# Patient Record
Sex: FEMALE | Race: OTHER | HISPANIC OR LATINO | URBAN - METROPOLITAN AREA
[De-identification: names, ages, dates, MRNs, and addresses within clinical notes are randomized per-mention and may not be internally consistent; named-entity substitution may affect disease eponyms.]

---

## 2017-04-21 ENCOUNTER — OUTPATIENT (OUTPATIENT)
Dept: OUTPATIENT SERVICES | Facility: HOSPITAL | Age: 62
LOS: 1 days | End: 2017-04-21
Payer: MEDICAID

## 2017-04-21 VITALS
HEART RATE: 84 BPM | WEIGHT: 130.07 LBS | HEIGHT: 62 IN | DIASTOLIC BLOOD PRESSURE: 78 MMHG | RESPIRATION RATE: 12 BRPM | OXYGEN SATURATION: 98 % | SYSTOLIC BLOOD PRESSURE: 133 MMHG | TEMPERATURE: 99 F

## 2017-04-21 DIAGNOSIS — Z01.818 ENCOUNTER FOR OTHER PREPROCEDURAL EXAMINATION: ICD-10-CM

## 2017-04-21 DIAGNOSIS — Z90.49 ACQUIRED ABSENCE OF OTHER SPECIFIED PARTS OF DIGESTIVE TRACT: Chronic | ICD-10-CM

## 2017-04-21 DIAGNOSIS — Z90.710 ACQUIRED ABSENCE OF BOTH CERVIX AND UTERUS: Chronic | ICD-10-CM

## 2017-04-21 DIAGNOSIS — K64.1 SECOND DEGREE HEMORRHOIDS: ICD-10-CM

## 2017-04-21 PROCEDURE — G0463: CPT

## 2017-04-21 NOTE — H&P PST ADULT - NSANTHOSAYNRD_GEN_A_CORE
No. CHAVO screening performed.  STOP BANG Legend: 0-2 = LOW Risk; 3-4 = INTERMEDIATE Risk; 5-8 = HIGH Risk

## 2017-05-03 ENCOUNTER — TRANSCRIPTION ENCOUNTER (OUTPATIENT)
Age: 62
End: 2017-05-03

## 2017-05-04 ENCOUNTER — OUTPATIENT (OUTPATIENT)
Dept: OUTPATIENT SERVICES | Facility: HOSPITAL | Age: 62
LOS: 1 days | End: 2017-05-04
Payer: MEDICAID

## 2017-05-04 VITALS
HEIGHT: 62 IN | RESPIRATION RATE: 15 BRPM | WEIGHT: 130.07 LBS | DIASTOLIC BLOOD PRESSURE: 72 MMHG | SYSTOLIC BLOOD PRESSURE: 136 MMHG | OXYGEN SATURATION: 98 % | HEART RATE: 77 BPM | TEMPERATURE: 98 F

## 2017-05-04 VITALS
HEART RATE: 72 BPM | SYSTOLIC BLOOD PRESSURE: 130 MMHG | DIASTOLIC BLOOD PRESSURE: 80 MMHG | TEMPERATURE: 97 F | RESPIRATION RATE: 15 BRPM | OXYGEN SATURATION: 100 %

## 2017-05-04 DIAGNOSIS — K64.1 SECOND DEGREE HEMORRHOIDS: ICD-10-CM

## 2017-05-04 DIAGNOSIS — Z90.49 ACQUIRED ABSENCE OF OTHER SPECIFIED PARTS OF DIGESTIVE TRACT: Chronic | ICD-10-CM

## 2017-05-04 DIAGNOSIS — Z90.710 ACQUIRED ABSENCE OF BOTH CERVIX AND UTERUS: Chronic | ICD-10-CM

## 2017-05-04 PROCEDURE — 88304 TISSUE EXAM BY PATHOLOGIST: CPT

## 2017-05-04 PROCEDURE — C1889: CPT

## 2017-05-04 PROCEDURE — 88304 TISSUE EXAM BY PATHOLOGIST: CPT | Mod: 26

## 2017-05-04 PROCEDURE — 46260 REMOVE IN/EX HEM GROUPS 2+: CPT

## 2017-05-04 RX ORDER — CELECOXIB 200 MG/1
200 CAPSULE ORAL ONCE
Qty: 0 | Refills: 0 | Status: DISCONTINUED | OUTPATIENT
Start: 2017-05-04 | End: 2017-05-19

## 2017-05-04 RX ORDER — SODIUM CHLORIDE 9 MG/ML
1000 INJECTION, SOLUTION INTRAVENOUS
Qty: 0 | Refills: 0 | Status: DISCONTINUED | OUTPATIENT
Start: 2017-05-04 | End: 2017-05-19

## 2017-05-04 RX ORDER — MINOXIDIL 10 MG
0 TABLET ORAL
Qty: 0 | Refills: 0 | COMMUNITY

## 2017-05-04 RX ORDER — CHOLECALCIFEROL (VITAMIN D3) 125 MCG
50000 CAPSULE ORAL
Qty: 0 | Refills: 0 | COMMUNITY

## 2017-05-04 RX ORDER — OXYCODONE HYDROCHLORIDE 5 MG/1
5 TABLET ORAL ONCE
Qty: 0 | Refills: 0 | Status: DISCONTINUED | OUTPATIENT
Start: 2017-05-04 | End: 2017-05-04

## 2017-05-04 RX ORDER — ONDANSETRON 8 MG/1
4 TABLET, FILM COATED ORAL ONCE
Qty: 0 | Refills: 0 | Status: DISCONTINUED | OUTPATIENT
Start: 2017-05-04 | End: 2017-05-19

## 2017-05-04 RX ORDER — FAMOTIDINE 10 MG/ML
1 INJECTION INTRAVENOUS
Qty: 0 | Refills: 0 | COMMUNITY

## 2017-05-04 RX ORDER — ACETAMINOPHEN 500 MG
975 TABLET ORAL ONCE
Qty: 0 | Refills: 0 | Status: DISCONTINUED | OUTPATIENT
Start: 2017-05-04 | End: 2017-05-19

## 2017-05-04 NOTE — ASU DISCHARGE PLAN (ADULT/PEDIATRIC). - MEDICATION SUMMARY - MEDICATIONS TO TAKE
I will START or STAY ON the medications listed below when I get home from the hospital:    naproxen 375 mg oral tablet  -- 1 tab(s) by mouth once a day, As Needed  -- Indication: For pain contol    pravastatin 20 mg oral tablet  -- 1 tab(s) by mouth once a day  -- Indication: For HLD    minoxidil 2.5 mg oral tablet  --  by mouth once a day  -- Indication: For HTN    Vitamin D3  -- 99071 milligram(s) by mouth weekly  -- Indication: For Supplement

## 2018-03-17 NOTE — BRIEF OPERATIVE NOTE - ANTIBIOTIC PROTOCOL
Followed protocol
no abdominal pain, no bloating, no constipation, no diarrhea, no nausea and no vomiting.

## 2020-01-18 PROBLEM — K57.92 DIVERTICULITIS OF INTESTINE, PART UNSPECIFIED, WITHOUT PERFORATION OR ABSCESS WITHOUT BLEEDING: Chronic | Status: ACTIVE | Noted: 2017-04-21

## 2020-01-18 PROBLEM — I86.8 VARICOSE VEINS OF OTHER SPECIFIED SITES: Chronic | Status: ACTIVE | Noted: 2017-04-21

## 2020-01-18 PROBLEM — L65.9 NONSCARRING HAIR LOSS, UNSPECIFIED: Chronic | Status: ACTIVE | Noted: 2017-04-21

## 2020-01-18 PROBLEM — K64.9 UNSPECIFIED HEMORRHOIDS: Chronic | Status: ACTIVE | Noted: 2017-04-21

## 2020-01-18 PROBLEM — E78.5 HYPERLIPIDEMIA, UNSPECIFIED: Chronic | Status: ACTIVE | Noted: 2017-04-21

## 2020-01-23 PROBLEM — Z00.00 ENCOUNTER FOR PREVENTIVE HEALTH EXAMINATION: Status: ACTIVE | Noted: 2020-01-23

## 2020-01-24 ENCOUNTER — APPOINTMENT (OUTPATIENT)
Dept: ORTHOPEDIC SURGERY | Facility: CLINIC | Age: 65
End: 2020-01-24
Payer: MEDICAID

## 2020-01-24 VITALS — BODY MASS INDEX: 22.68 KG/M2 | WEIGHT: 128 LBS | HEIGHT: 63 IN

## 2020-01-24 DIAGNOSIS — G56.01 CARPAL TUNNEL SYNDROME, RIGHT UPPER LIMB: ICD-10-CM

## 2020-01-24 DIAGNOSIS — Z56.0 UNEMPLOYMENT, UNSPECIFIED: ICD-10-CM

## 2020-01-24 DIAGNOSIS — M75.81 OTHER SHOULDER LESIONS, RIGHT SHOULDER: ICD-10-CM

## 2020-01-24 DIAGNOSIS — Z83.3 FAMILY HISTORY OF DIABETES MELLITUS: ICD-10-CM

## 2020-01-24 DIAGNOSIS — Z80.9 FAMILY HISTORY OF MALIGNANT NEOPLASM, UNSPECIFIED: ICD-10-CM

## 2020-01-24 DIAGNOSIS — Z86.39 PERSONAL HISTORY OF OTHER ENDOCRINE, NUTRITIONAL AND METABOLIC DISEASE: ICD-10-CM

## 2020-01-24 DIAGNOSIS — M54.2 CERVICALGIA: ICD-10-CM

## 2020-01-24 DIAGNOSIS — Z72.3 LACK OF PHYSICAL EXERCISE: ICD-10-CM

## 2020-01-24 PROCEDURE — 72040 X-RAY EXAM NECK SPINE 2-3 VW: CPT

## 2020-01-24 PROCEDURE — 99203 OFFICE O/P NEW LOW 30 MIN: CPT | Mod: 25

## 2020-01-24 PROCEDURE — 73030 X-RAY EXAM OF SHOULDER: CPT | Mod: RT

## 2020-01-24 PROCEDURE — 20610 DRAIN/INJ JOINT/BURSA W/O US: CPT | Mod: RT

## 2020-01-24 SDOH — ECONOMIC STABILITY - INCOME SECURITY: UNEMPLOYMENT, UNSPECIFIED: Z56.0

## 2020-01-25 RX ORDER — HYDROCORTISONE 10 MG/G
1 CREAM TOPICAL
Qty: 80 | Refills: 0 | Status: ACTIVE | COMMUNITY
Start: 2020-01-12

## 2020-01-25 NOTE — PHYSICAL EXAM
[de-identified] : Cervical radiographs obtained in the office today shows no fracture or dislocation. \par \par Right shoulder radiographs obtained in the office today shows no fracture or dislocation.  [de-identified] : General: No acute distress, conversant, well-nourished.\par Head: Normocephalic, atraumatic\par Neck: trachea midline, FROM\par Heart: normotensive and normal rate and rhythm\par Lungs: No labored breathing\par Skin: No abrasions, no rashes, no edema\par Psych: Alert and oriented to person, place and time\par Extremities: no peripheral edema or digital cyanosis\par Gait: Normal gait. Can perform tandem gait.  \par Vascular: warm and well perfused distally, palpable distal pulses\par \par MSK:\par Right Shoulder Exam:\par No swelling, no erythema.  \par No tenderness to palpation. \par Pain with active ROM above head\par \par Positive Neer's impingement sign\par Positive Hawkin's test\par Positive Catarino's test\par Pain but good strength with shoulder ER and IR.\par \par No tenderness at bicipital groove\par Negative Speed's test\par Negative Yergson's test\par \par Negative Cross-body Adduction\par Negative Greeley's test\par \par Right wrist: +durkan's test\par \par Sensation intact to light touch axillary, medial and lateral antebrachial cutaneous, median, radial and ulnar distributions\par +motor deltoid, biceps, triceps, EPL, FDP and IO\par palpable radial pulse, WWP distally

## 2020-01-25 NOTE — HISTORY OF PRESENT ILLNESS
[de-identified] : 64 year old female with neck pain and right shoulder pain.  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  She has difficulty raising her arm above her head due to pain.  She also says that she occasionally wakes up at night with tingling in her hand.  She does not take pain medications. She has used menthol to help with the pain.

## 2020-01-25 NOTE — PROCEDURE
[de-identified] : Procedure: Right shoulder subacromial injection with corticosteroid\par Pre-Procedure Diagnosis: Right rotator cuff tendinitis\par Post-Procedure Diagnosis: same\par \par The patient was educated about the risks and benefits of a corticosteroid injection.  Alternatives were discussed.  The patient understood and consented for the procedure.\par \par The area was sterilely prepped using isopropyl alcohol.  An ethyl chloride spray provided  local anesthesia.  Using the usual sterile technique, 1 ml of 40mg/1ml of Kenalog and 2 ml of Lidocaine 1% without epinephrine was injected into the right shoulder subacromial space.  A dressing was applied to the area.  The patient tolerated the procedure well and without complication.\par 
no

## 2020-03-27 ENCOUNTER — APPOINTMENT (OUTPATIENT)
Dept: ORTHOPEDIC SURGERY | Facility: CLINIC | Age: 65
End: 2020-03-27

## 2023-06-14 NOTE — H&P PST ADULT - NS PRO ABUSE SCREEN SUSPICION NEGLECT YN
Quality 226: Preventive Care And Screening: Tobacco Use: Screening And Cessation Intervention: Patient screened for tobacco use and is an ex/non-smoker Quality 130: Documentation Of Current Medications In The Medical Record: Current Medications Documented Detail Level: Detailed no

## 2023-08-02 ENCOUNTER — APPOINTMENT (OUTPATIENT)
Dept: GYNECOLOGIC ONCOLOGY | Facility: CLINIC | Age: 68
End: 2023-08-02

## 2024-12-10 NOTE — H&P PST ADULT - NSANTHOBSERVEDRD_ENT_A_CORE
Reason for call:   [x] Refill   [] Prior Auth  [] Other:     Office:   [] PCP/Provider -   [x] Specialty/Provider - Spine and Pain    Medication:   Gabapentin 600mg- take 1.5 PO AM and 2 PO HS    Pharmacy: [facility]    Does the patient have enough for 3 days?   [x] Yes   [] No - Send as HP to POD     No